# Patient Record
Sex: MALE | Race: WHITE | URBAN - NONMETROPOLITAN AREA
[De-identification: names, ages, dates, MRNs, and addresses within clinical notes are randomized per-mention and may not be internally consistent; named-entity substitution may affect disease eponyms.]

---

## 2019-02-25 LAB
ALBUMIN SERPL-MCNC: NORMAL G/DL
ALP BLD-CCNC: NORMAL U/L
ALT SERPL-CCNC: NORMAL U/L
ANION GAP SERPL CALCULATED.3IONS-SCNC: NORMAL MMOL/L
AST SERPL-CCNC: NORMAL U/L
BILIRUB SERPL-MCNC: NORMAL MG/DL
BUN BLDV-MCNC: NORMAL MG/DL
CALCIUM SERPL-MCNC: NORMAL MG/DL
CHLORIDE BLD-SCNC: NORMAL MMOL/L
CHOLESTEROL, TOTAL: NORMAL
CHOLESTEROL/HDL RATIO: NORMAL
CO2: NORMAL
CREAT SERPL-MCNC: NORMAL MG/DL
GFR CALCULATED: NORMAL
GLUCOSE BLD-MCNC: NORMAL MG/DL
HDLC SERPL-MCNC: NORMAL MG/DL
LDL CHOLESTEROL CALCULATED: NORMAL
POTASSIUM SERPL-SCNC: NORMAL MMOL/L
SODIUM BLD-SCNC: NORMAL MMOL/L
TOTAL PROTEIN: NORMAL
TRIGL SERPL-MCNC: NORMAL MG/DL
TSH SERPL DL<=0.05 MIU/L-ACNC: NORMAL M[IU]/L
VLDLC SERPL CALC-MCNC: NORMAL MG/DL

## 2019-05-30 ENCOUNTER — TELEPHONE (OUTPATIENT)
Dept: FAMILY MEDICINE CLINIC | Age: 29
End: 2019-05-30

## 2019-05-30 NOTE — LETTER
[address] [facility]  [address], [address] [address]  Phone: [phone]  Fax: [phone]    Mateusz Kolb MD        June 13, 2019    [address] [place] [address]      Dear Caterina Pettit:      I have been unable to reach you by phone, we have your test results. Please return our call at your earliest convenience. If you have any questions or concerns, please don't hesitate to call.     Sincerely,        Mateusz Kolb MD

## 2019-05-30 NOTE — TELEPHONE ENCOUNTER
Dad Lanita Severin calling in regards to pts' quest bill. Invoice#[phone] drawn on 02/25/19 . He states the tsh was billed as preventative and needs to be diagnostic. Ok to add what dx? He is aware you are not in today.

## 2020-01-03 SDOH — HEALTH STABILITY: MENTAL HEALTH: HOW OFTEN DO YOU HAVE A DRINK CONTAINING ALCOHOL?: NEVER

## 2020-02-24 ENCOUNTER — HOSPITAL ENCOUNTER (OUTPATIENT)
Age: 30
Discharge: HOME | End: 2020-02-26
Payer: COMMERCIAL

## 2020-02-24 ENCOUNTER — OFFICE VISIT (OUTPATIENT)
Dept: PRIMARY CARE CLINIC | Age: 30
End: 2020-02-24
Payer: COMMERCIAL

## 2020-02-24 VITALS
HEIGHT: 70 IN | BODY MASS INDEX: 24.62 KG/M2 | SYSTOLIC BLOOD PRESSURE: 108 MMHG | DIASTOLIC BLOOD PRESSURE: 68 MMHG | WEIGHT: 172 LBS | HEART RATE: 81 BPM | TEMPERATURE: 98.4 F | RESPIRATION RATE: 18 BRPM | OXYGEN SATURATION: 98 %

## 2020-02-24 PROBLEM — F84.0 AUTISM: Status: ACTIVE | Noted: 2020-02-24

## 2020-02-24 PROBLEM — E78.2 MIXED HYPERLIPIDEMIA: Status: ACTIVE | Noted: 2020-02-24

## 2020-02-24 PROBLEM — Z00.00 ENCOUNTER FOR ANNUAL PHYSICAL EXAM: Status: ACTIVE | Noted: 2020-02-24

## 2020-02-24 LAB
CHOLESTEROL, TOTAL: 244 MG/DL (ref 0–199)
HDLC SERPL-MCNC: 50 MG/DL
LDL CHOLESTEROL CALCULATED: 148 MG/DL (ref 0–99)
TRIGL SERPL-MCNC: 229 MG/DL (ref 0–149)
TSH SERPL DL<=0.05 MIU/L-ACNC: 2.89 UIU/ML (ref 0.27–4.2)
VLDLC SERPL CALC-MCNC: 46 MG/DL

## 2020-02-24 PROCEDURE — 99395 PREV VISIT EST AGE 18-39: CPT | Performed by: INTERNAL MEDICINE

## 2020-02-24 PROCEDURE — 84443 ASSAY THYROID STIM HORMONE: CPT

## 2020-02-24 PROCEDURE — 80061 LIPID PANEL: CPT

## 2020-02-24 PROCEDURE — 36415 COLL VENOUS BLD VENIPUNCTURE: CPT

## 2020-02-24 ASSESSMENT — PATIENT HEALTH QUESTIONNAIRE - PHQ9
1. LITTLE INTEREST OR PLEASURE IN DOING THINGS: 0
SUM OF ALL RESPONSES TO PHQ9 QUESTIONS 1 & 2: 0
SUM OF ALL RESPONSES TO PHQ QUESTIONS 1-9: 0
SUM OF ALL RESPONSES TO PHQ QUESTIONS 1-9: 0
2. FEELING DOWN, DEPRESSED OR HOPELESS: 0

## 2020-02-24 NOTE — PROGRESS NOTES
canals are clear and dry. Tympanic membranes are intact. External nose WNL. Moistness and normal color of the nasal mucosae. Septum is in the midline. Dentition is in good  repair. Gums appear healthy. Posterior pharynx shows no exudate, irritation or redness. Neck: Supple and symmetric. Palpation reveals no adenopathy. No masses appreciated. Thyroid  exhibits no nodules or thyromegaly. No JVD. Resp: Respirations are unlabored. Respiration rate is normal. Auscultate good airflow. No rales,  rhonchi or wheezes appreciated over the lungs bilaterally. CV: Rhythm is regular. S1 is normal. S2 is normal. No heart murmur appreciated. Carotids: no  bruits. Abdominal aorta is not palpable. Pedal pulses: 2+ and equal bilaterally. Extremities: No clubbing, cyanosis or edema. Abdomen: Bowel sounds are normoactive. Palpation of the abdomen reveals softness, but no  distension, organomegaly or tenderness. No abdominal masses. No palpable hepatosplenomegaly. Musculo: Walks with a normal gait. Upper Extremities: Full ROM bilaterally. Lower Extremities: Full  ROM bilaterally. Skin: Skin is warm and dry. Neuro: Alert and oriented x3. Mood is normal. Affect is normal. Speech is articulate and fluent. Reflexes: DTR's are intact, symmetric and 2+ bilaterally. Psych: Patient's attitude is cooperative. Patient's affect is appropriate. Judgement is realistic. Insight  is appropriate. Bhavesh was seen today for annual exam.    Diagnoses and all orders for this visit:    Mixed hyperlipidemia  -     TSH without Reflex; Future  -     Lipid Panel; Future    Encounter for annual physical exam    Autism    Patient is to be seen back in 1 year. Lipid profile will be obtained. Because of the family history of hypothyroidism this will also be checked. Patient's family does not wish him to have influenza vaccination.

## 2020-03-25 PROBLEM — Z00.00 ENCOUNTER FOR ANNUAL PHYSICAL EXAM: Status: RESOLVED | Noted: 2020-02-24 | Resolved: 2020-03-25

## 2021-10-07 ENCOUNTER — OFFICE VISIT (OUTPATIENT)
Dept: PRIMARY CARE CLINIC | Age: 31
End: 2021-10-07

## 2021-10-07 VITALS
OXYGEN SATURATION: 98 % | TEMPERATURE: 97.5 F | BODY MASS INDEX: 27.49 KG/M2 | WEIGHT: 192 LBS | HEART RATE: 74 BPM | HEIGHT: 70 IN | SYSTOLIC BLOOD PRESSURE: 126 MMHG | DIASTOLIC BLOOD PRESSURE: 80 MMHG

## 2021-10-07 DIAGNOSIS — Z00.00 ENCOUNTER FOR PREVENTIVE HEALTH EXAMINATION: ICD-10-CM

## 2021-10-07 DIAGNOSIS — K21.9 GASTROESOPHAGEAL REFLUX DISEASE WITHOUT ESOPHAGITIS: Primary | ICD-10-CM

## 2021-10-07 DIAGNOSIS — E78.2 MIXED HYPERLIPIDEMIA: ICD-10-CM

## 2021-10-07 DIAGNOSIS — F84.0 AUTISM: ICD-10-CM

## 2021-10-07 PROCEDURE — 99395 PREV VISIT EST AGE 18-39: CPT | Performed by: INTERNAL MEDICINE

## 2021-10-07 RX ORDER — OMEPRAZOLE 20 MG/1
20 CAPSULE, DELAYED RELEASE ORAL
Qty: 90 CAPSULE | Refills: 3 | Status: SHIPPED | OUTPATIENT
Start: 2021-10-07

## 2021-10-07 NOTE — PROGRESS NOTES
This is a patient with high functioning autism. 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 Patient has done well. He denies any cardiac or respiratory symptoms. Functional status is excellent. He has had both Covid vaccinations. He has no physical limitations including any musculoskeletal problems. He has had symptoms of reflux and he currently is on over-the-counter omeprazole which he takes on a continuous basis. I did write a note to his mother indicating that perhaps a switch to an H2 blocker may be advisable especially if he is going to be on it long-term. The patient is not having any danger symptoms of reflux. Patient does have a history of hyperlipidemia. His reflux symptomatology seems to be brought on by intake of spicy or tomato based products. We did discuss trying to eliminate these. HPI:  ROS:  Const: General health stated as excellent. Eyes: Denies eye symptoms. ENMT: Denies ear symptoms. Denies nasal symptoms. CV: Denies cardiovascular symptoms. Resp: Denies respiratory symptoms. GI: GERD without danger symptoms. : Denies urinary problems. Musculo: Denies musculoskeletal symptoms. Skin: Denies current symptomatology. Neuro: Denies neurologic symptoms. Psych: AUTISM  Endocrine: Denies endocrine symptoms. Hema/Lymph: Denies hematologic symptoms. Denies lymphatic symptoms. Current Outpatient Medications:     omeprazole (PRILOSEC) 20 MG delayed release capsule, Take 1 capsule by mouth every morning (before breakfast), Disp: 90 capsule, Rfl: 3  Allergies: NKDA  PMH:  Health Maintenance:  Physical Exam - (10/7/2021)  Tetanus Immunization - 2014  Medical Problems:  Autism - HIGH FUNCTION  GERD-10/7/2021  Hyperliidemia 2019  Surgical Hx:  Appendectomy - AGE 11  Reviewed and updated. FH:  Father:  Hyperlipidemia, Non Insulin Dependent Diabetes, Hypertension, Osteoarthritis. Mother:  Hypothyroidism, Kidney Stones, Hypertension.   Reviewed, no changes. SH:  Marital: Legal Status: Never . Personal Habits: Cigarette Use: Never Smoked Cigarettes. Alcohol: Denies alcohol use. Exercise  Type: Exercises regularly. Reviewed, no changes. Date: 10/7/2021  Was the patient queried about smoking behavior? Yes   Does the patient currently smoke? Smoking: Patient has never smoked. Objective  Vitals:    10/07/21 0927   BP: 126/80   Pulse: 74   Temp: 97.5 °F (36.4 °C)   SpO2: 98%   Exam:  Const: Appears healthy,well developed and well nourished. Appears to be moderately overweight. Eyes: EOMI in both eyes. PERRL. ENMT: External canals are clear and dry. Partially occluded external ear canals with cerumen. Tympanic membrane seen appear to be clear. Neck: Supple and symmetric. Palpation reveals no adenopathy. No masses appreciated. Thyroid  exhibits no nodules or thyromegaly. No JVD. Resp: Respirations are unlabored. Respiration rate is normal. Auscultate good airflow. No rales,  rhonchi or wheezes appreciated over the lungs bilaterally. CV: Rhythm is regular. S1 is normal. S2 is normal. No heart murmur appreciated. Carotids: no  bruits. Abdominal aorta is not palpable. Pedal pulses: 2+ and equal bilaterally. Extremities: No clubbing, cyanosis or edema. Abdomen: Bowel sounds are normoactive. Palpation of the abdomen reveals softness, but no  distension, organomegaly or tenderness. No abdominal masses. No palpable hepatosplenomegaly. Musculo: Walks with a normal gait. Upper Extremities: Full ROM bilaterally. Lower Extremities: Full  ROM bilaterally. Skin: Skin is warm and dry. Neuro: Alert and oriented x3. Mood is normal. Affect is normal. Speech is articulate and fluent. Reflexes: DTR's are intact, symmetric and 2+ bilaterally. Psych: Patient's attitude is cooperative. Patient's affect is appropriate. Judgement is realistic. Insight  is appropriate.   Bhavesh was seen today for annual exam.    Diagnoses and all orders for this visit:    Gastroesophageal reflux disease without esophagitis  -     omeprazole (PRILOSEC) 20 MG delayed release capsule; Take 1 capsule by mouth every morning (before breakfast)  -     Lipid Panel; Future  -     CBC Auto Differential; Future    Encounter for preventive health examination    Autism    Mixed hyperlipidemia  -     Lipid Panel; Future  -     CBC Auto Differential; Future    I would like the patient to taper off of omeprazole and begin an H2 blocker instead. I would like him to eliminate foods which cause heartburn symptomatology. We will check a lipid profile as he has hyperlipidemia and is has a strong family history of diabetes and hyperlipidemia. Patient is to be seen back in 1 year. Participation forms are completed. Bhargav Stevan

## 2021-10-19 ENCOUNTER — TELEPHONE (OUTPATIENT)
Dept: FAMILY MEDICINE CLINIC | Age: 31
End: 2021-10-19

## 2021-10-19 NOTE — TELEPHONE ENCOUNTER
Notified pt and father, Lovely Al of lab results (unable to document in the lab note/not in the basket). Sent copy of labs and the Mediterranean/dash diet to pt.

## 2021-11-06 PROBLEM — Z00.00 ENCOUNTER FOR PREVENTIVE HEALTH EXAMINATION: Status: RESOLVED | Noted: 2020-02-24 | Resolved: 2021-11-06

## 2022-11-21 ENCOUNTER — TELEPHONE (OUTPATIENT)
Dept: FAMILY MEDICINE CLINIC | Age: 32
End: 2022-11-21

## 2022-11-21 DIAGNOSIS — K21.9 GASTROESOPHAGEAL REFLUX DISEASE WITHOUT ESOPHAGITIS: ICD-10-CM

## 2022-11-21 RX ORDER — OMEPRAZOLE 20 MG/1
CAPSULE, DELAYED RELEASE ORAL
Qty: 90 CAPSULE | Refills: 0 | Status: SHIPPED | OUTPATIENT
Start: 2022-11-21

## 2022-11-21 NOTE — TELEPHONE ENCOUNTER
----- Message from April Nagle sent at 11/21/2022  2:07 PM EST -----  Subject: Refill Request    QUESTIONS  Name of Medication? omeprazole (PRILOSEC) 20 MG delayed release capsule  Patient-reported dosage and instructions? TAKE ONE CAPSULE BY MOUTH EVERY   MORNING BEFORE BREAKFAST  How many days do you have left? 0  Preferred Pharmacy? [facility]  Pharmacy phone number (if available)? [phone]  ---------------------------------------------------------------------------  --------------  CALL BACK INFO  What is the best way for the office to contact you? OK to leave message on   voicemail  Preferred Call Back Phone Number? [phone]  ---------------------------------------------------------------------------  --------------  SCRIPT ANSWERS  Relationship to Patient? Parent  Representative Name? Antoinette Mariano  Patient is under 25 and the Parent has custody? Yes  Additional information verified (besides Name and Date of Birth)?  Address

## 2022-11-21 NOTE — TELEPHONE ENCOUNTER
90 day supply was sent. Patient will need a follow up. Unable to leave a message to update. Letter mailed to patient.

## 2022-11-21 NOTE — LETTER
[address] [place] [address] Drive  [address] [place] [address]  Phone: [phone]  Fax: [phone]    Milagro Brewer MD        November 21, 2022     [address] [place] [address]      Dear Emmy Query:    Our records indicate that you are past due for a follow up appointment. A 90 day refill has been sent to your pharmacy. Please call to schedule a follow up appointment within the next 90 days.      Sincerely,        Milagro Brewer MD

## 2023-02-07 ENCOUNTER — OFFICE VISIT (OUTPATIENT)
Dept: FAMILY MEDICINE CLINIC | Age: 33
End: 2023-02-07
Payer: MEDICAID

## 2023-02-07 VITALS
SYSTOLIC BLOOD PRESSURE: 130 MMHG | WEIGHT: 197 LBS | TEMPERATURE: 97.8 F | BODY MASS INDEX: 28.27 KG/M2 | DIASTOLIC BLOOD PRESSURE: 80 MMHG | HEART RATE: 94 BPM | OXYGEN SATURATION: 99 %

## 2023-02-07 DIAGNOSIS — R10.13 EPIGASTRIC PAIN: ICD-10-CM

## 2023-02-07 DIAGNOSIS — R35.1 NOCTURIA: ICD-10-CM

## 2023-02-07 DIAGNOSIS — E78.2 MIXED HYPERLIPIDEMIA: ICD-10-CM

## 2023-02-07 DIAGNOSIS — F84.0 AUTISM: Primary | ICD-10-CM

## 2023-02-07 LAB
ALBUMIN SERPL-MCNC: 4.4 G/DL (ref 3.5–5.2)
ALP BLD-CCNC: 74 U/L (ref 40–129)
ALT SERPL-CCNC: 36 U/L (ref 0–40)
ANION GAP SERPL CALCULATED.3IONS-SCNC: 15 MMOL/L (ref 7–16)
AST SERPL-CCNC: 26 U/L (ref 0–39)
BASOPHILS ABSOLUTE: 0.07 E9/L (ref 0–0.2)
BASOPHILS RELATIVE PERCENT: 1 % (ref 0–2)
BILIRUB SERPL-MCNC: 0.3 MG/DL (ref 0–1.2)
BILIRUBIN URINE: NEGATIVE
BLOOD, URINE: NEGATIVE
BUN BLDV-MCNC: 11 MG/DL (ref 6–20)
CALCIUM SERPL-MCNC: 9.5 MG/DL (ref 8.6–10.2)
CHLORIDE BLD-SCNC: 102 MMOL/L (ref 98–107)
CHOLESTEROL, TOTAL: 267 MG/DL (ref 0–199)
CLARITY: CLEAR
CO2: 21 MMOL/L (ref 22–29)
COLOR: YELLOW
CREAT SERPL-MCNC: 0.9 MG/DL (ref 0.7–1.2)
EOSINOPHILS ABSOLUTE: 0.16 E9/L (ref 0.05–0.5)
EOSINOPHILS RELATIVE PERCENT: 2.4 % (ref 0–6)
GFR SERPL CREATININE-BSD FRML MDRD: >60 ML/MIN/1.73
GLUCOSE BLD-MCNC: 89 MG/DL (ref 74–99)
GLUCOSE URINE: NEGATIVE MG/DL
HCT VFR BLD CALC: 45.3 % (ref 37–54)
HDLC SERPL-MCNC: 44 MG/DL
HEMOGLOBIN: 15.5 G/DL (ref 12.5–16.5)
IMMATURE GRANULOCYTES #: 0.04 E9/L
IMMATURE GRANULOCYTES %: 0.6 % (ref 0–5)
KETONES, URINE: NEGATIVE MG/DL
LDL CHOLESTEROL CALCULATED: 186 MG/DL (ref 0–99)
LEUKOCYTE ESTERASE, URINE: NEGATIVE
LIPASE: 23 U/L (ref 13–60)
LYMPHOCYTES ABSOLUTE: 1.84 E9/L (ref 1.5–4)
LYMPHOCYTES RELATIVE PERCENT: 27.5 % (ref 20–42)
MCH RBC QN AUTO: 31.3 PG (ref 26–35)
MCHC RBC AUTO-ENTMCNC: 34.2 % (ref 32–34.5)
MCV RBC AUTO: 91.5 FL (ref 80–99.9)
MONOCYTES ABSOLUTE: 0.55 E9/L (ref 0.1–0.95)
MONOCYTES RELATIVE PERCENT: 8.2 % (ref 2–12)
NEUTROPHILS ABSOLUTE: 4.04 E9/L (ref 1.8–7.3)
NEUTROPHILS RELATIVE PERCENT: 60.3 % (ref 43–80)
NITRITE, URINE: NEGATIVE
PDW BLD-RTO: 12.9 FL (ref 11.5–15)
PH UA: 6.5 (ref 5–9)
PLATELET # BLD: 289 E9/L (ref 130–450)
PMV BLD AUTO: 10.1 FL (ref 7–12)
POTASSIUM SERPL-SCNC: 4.5 MMOL/L (ref 3.5–5)
PROTEIN UA: NEGATIVE MG/DL
RBC # BLD: 4.95 E12/L (ref 3.8–5.8)
SODIUM BLD-SCNC: 138 MMOL/L (ref 132–146)
SPECIFIC GRAVITY UA: 1.02 (ref 1–1.03)
TOTAL PROTEIN: 7.7 G/DL (ref 6.4–8.3)
TRIGL SERPL-MCNC: 183 MG/DL (ref 0–149)
TSH SERPL DL<=0.05 MIU/L-ACNC: 5.53 UIU/ML (ref 0.27–4.2)
UROBILINOGEN, URINE: 0.2 E.U./DL
VLDLC SERPL CALC-MCNC: 37 MG/DL
WBC # BLD: 6.7 E9/L (ref 4.5–11.5)

## 2023-02-07 PROCEDURE — 81003 URINALYSIS AUTO W/O SCOPE: CPT | Performed by: INTERNAL MEDICINE

## 2023-02-07 PROCEDURE — 99214 OFFICE O/P EST MOD 30 MIN: CPT | Performed by: INTERNAL MEDICINE

## 2023-02-07 RX ORDER — OMEPRAZOLE 20 MG/1
20 CAPSULE, DELAYED RELEASE ORAL
Qty: 30 CAPSULE | Refills: 5 | Status: SHIPPED | OUTPATIENT
Start: 2023-02-07

## 2023-02-07 ASSESSMENT — PATIENT HEALTH QUESTIONNAIRE - PHQ9
1. LITTLE INTEREST OR PLEASURE IN DOING THINGS: 1
9. THOUGHTS THAT YOU WOULD BE BETTER OFF DEAD, OR OF HURTING YOURSELF: 0
8. MOVING OR SPEAKING SO SLOWLY THAT OTHER PEOPLE COULD HAVE NOTICED. OR THE OPPOSITE, BEING SO FIGETY OR RESTLESS THAT YOU HAVE BEEN MOVING AROUND A LOT MORE THAN USUAL: 1
4. FEELING TIRED OR HAVING LITTLE ENERGY: 3
2. FEELING DOWN, DEPRESSED OR HOPELESS: 3
10. IF YOU CHECKED OFF ANY PROBLEMS, HOW DIFFICULT HAVE THESE PROBLEMS MADE IT FOR YOU TO DO YOUR WORK, TAKE CARE OF THINGS AT HOME, OR GET ALONG WITH OTHER PEOPLE: 1
5. POOR APPETITE OR OVEREATING: 1
3. TROUBLE FALLING OR STAYING ASLEEP: 1
SUM OF ALL RESPONSES TO PHQ QUESTIONS 1-9: 10
SUM OF ALL RESPONSES TO PHQ QUESTIONS 1-9: 10
7. TROUBLE CONCENTRATING ON THINGS, SUCH AS READING THE NEWSPAPER OR WATCHING TELEVISION: 0
6. FEELING BAD ABOUT YOURSELF - OR THAT YOU ARE A FAILURE OR HAVE LET YOURSELF OR YOUR FAMILY DOWN: 0
SUM OF ALL RESPONSES TO PHQ QUESTIONS 1-9: 10
SUM OF ALL RESPONSES TO PHQ9 QUESTIONS 1 & 2: 4
SUM OF ALL RESPONSES TO PHQ QUESTIONS 1-9: 10

## 2023-02-07 NOTE — PROGRESS NOTES
This is a patient with high functioning autism.  Patient has been out of his Prilosec for about 6 months.  Over the last month he developed epigastric discomfort which then travels to his left lower quadrant.  He has a lot of pain especially in the mornings.  This does seem to be relieved by bowel movements.  He denies any diarrhea.  Questionable dark stool.  Patient has not had any pruritus.  Pain does not radiate to his back.  He has no significant heartburn or reflux symptomatology.  He does have nocturia which is concerning especially given that he has gained about 40 pounds and there is a family history of diabetes.  Patient is not currently working.  He states that he is very fatigued.  He did note that he had some lymph node swelling of his neck mostly in the posterior cervical chain.  He has been tired for at least the last several months.  HPI:  ROS:  Const: General health stated as good.  Eyes: Denies eye symptoms.  ENMT: Denies ear symptoms. Denies nasal symptoms.  CV: Denies cardiovascular symptoms.  Resp: Denies respiratory symptoms.  GI: Per HPI  : Denies urinary problems.  Musculo: Denies musculoskeletal symptoms.  Skin: Denies current symptomatology.  Neuro: Denies neurologic symptoms.  Psych: AUTISM  Endocrine: Denies endocrine symptoms.  Hema/Lymph: Denies hematologic symptoms. Denies lymphatic symptoms.    Current Outpatient Medications:     omeprazole (PRILOSEC) 20 MG delayed release capsule, Take 1 capsule by mouth every morning (before breakfast), Disp: 30 capsule, Rfl: 5    omeprazole (PRILOSEC) 20 MG delayed release capsule, TAKE ONE CAPSULE BY MOUTH EVERY MORNING BEFORE BREAKFAST (Patient not taking: Reported on 2/7/2023), Disp: 90 capsule, Rfl: 0  Allergies: NKDA  PMH:  Health Maintenance:  Physical Exam - (2/7/2023)  Tetanus Immunization - 2014  Medical Problems:  Autism - HIGH FUNCTION  GERD-10/7/2021  Hyperliidemia 2019  Abdominal pain 2/7/2023  Nocturia 2/7/2023  Fatigue  2/7/2023  Surgical Hx:  Appendectomy - AGE 11  Reviewed and updated. FH:  Father:  Hyperlipidemia, Non Insulin Dependent Diabetes, Hypertension, Osteoarthritis. Mother:  Hypothyroidism, Kidney Stones, Hypertension. Reviewed, no changes. SH:  Marital: Legal Status: Never . Personal Habits: Cigarette Use: Never Smoked Cigarettes. Alcohol: Denies alcohol use. Exercise  Type: Exercises regularly. Reviewed, no changes. Date: 2/7/2023  Was the patient queried about smoking behavior? Yes   Does the patient currently smoke? Smoking: Patient has never smoked. Objective  Vitals:    02/07/23 1325   BP: 130/80   Pulse: 94   Temp: 97.8 °F (36.6 °C)   SpO2: 99%   Exam:  Const: Appears healthy,well developed and well nourished. Appears to be moderately overweight. Eyes: EOMI in both eyes. PERRL. ENMT: External canals are clear and dry. Partially occluded external ear canals with cerumen. Tympanic membrane seen appear to be clear. Tonsils are enlarged but without exudate. Neck: Supple and symmetric. Palpation reveals no adenopathy, questionable shotty posterior cervical lymph nodes. No masses appreciated. Thyroid  exhibits no nodules or thyromegaly. No JVD. Resp: Respirations are unlabored. Respiration rate is normal. Auscultate good airflow. No rales,  rhonchi or wheezes appreciated over the lungs bilaterally. CV: Rhythm is regular. S1 is normal. S2 is normal. No heart murmur appreciated. Carotids: no  bruits. Abdominal aorta is not palpable. Pedal pulses: 2+ and equal bilaterally. Extremities: No clubbing, cyanosis or edema. Abdomen: Bowel sounds are normoactive. Palpation of the abdomen reveals softness, but no  distension, organomegaly or tenderness. No abdominal masses. No palpable hepatosplenomegaly. Even with deep palpation I am not able to reproduce discomfort. Musculo: Walks with a normal gait. Upper Extremities: Full ROM bilaterally. Lower Extremities: Full  ROM bilaterally.   Skin: Skin is warm and dry. Neuro: Alert and oriented x3. Mood is normal. Affect is normal. Speech is articulate and fluent. Reflexes: DTR's are intact, symmetric and 2+ bilaterally. Psych: Patient's attitude is cooperative. Patient's affect is appropriate. Judgement is realistic. Insight  is appropriate. Bhavesh was seen today for fatigue and abdominal pain. Diagnoses and all orders for this visit:    Autism    Epigastric pain  -     Sedimentation Rate; Future  -     Lipase; Future  -     CBC with Auto Differential; Future  -     Comprehensive Metabolic Panel; Future  -     Urinalysis; Future  -     TSH; Future  -     Lipid Panel; Future    Mixed hyperlipidemia  -     Sedimentation Rate; Future  -     Lipase; Future  -     CBC with Auto Differential; Future  -     Comprehensive Metabolic Panel; Future  -     Urinalysis; Future  -     TSH; Future  -     Lipid Panel; Future    Nocturia  -     Sedimentation Rate; Future  -     Lipase; Future  -     CBC with Auto Differential; Future  -     Comprehensive Metabolic Panel; Future  -     Urinalysis; Future  -     TSH; Future  -     Lipid Panel; Future    Other orders  -     omeprazole (PRILOSEC) 20 MG delayed release capsule; Take 1 capsule by mouth every morning (before breakfast)  Patient will have a work-up. Will be placed back on omeprazole. I really cannot localize the discomfort by examination. His history is somewhat tangent. I will go ahead and see him back in 1 month. Lab work will be obtained today. Depending on the labs he may need further evaluation.

## 2023-02-08 RX ORDER — ROSUVASTATIN CALCIUM 20 MG/1
20 TABLET, COATED ORAL DAILY
Qty: 90 TABLET | Refills: 1 | Status: SHIPPED | OUTPATIENT
Start: 2023-02-08

## 2023-03-07 ENCOUNTER — OFFICE VISIT (OUTPATIENT)
Dept: FAMILY MEDICINE CLINIC | Age: 33
End: 2023-03-07
Payer: MEDICAID

## 2023-03-07 VITALS
OXYGEN SATURATION: 98 % | HEART RATE: 64 BPM | TEMPERATURE: 97.5 F | SYSTOLIC BLOOD PRESSURE: 120 MMHG | BODY MASS INDEX: 28.55 KG/M2 | WEIGHT: 199 LBS | DIASTOLIC BLOOD PRESSURE: 80 MMHG

## 2023-03-07 DIAGNOSIS — F84.0 AUTISM: ICD-10-CM

## 2023-03-07 DIAGNOSIS — E78.2 MIXED HYPERLIPIDEMIA: ICD-10-CM

## 2023-03-07 DIAGNOSIS — R10.13 EPIGASTRIC PAIN: ICD-10-CM

## 2023-03-07 DIAGNOSIS — H61.23 CERUMEN DEBRIS ON TYMPANIC MEMBRANE OF BOTH EARS: ICD-10-CM

## 2023-03-07 DIAGNOSIS — E78.2 MIXED HYPERLIPIDEMIA: Primary | ICD-10-CM

## 2023-03-07 PROCEDURE — 99213 OFFICE O/P EST LOW 20 MIN: CPT | Performed by: INTERNAL MEDICINE

## 2023-03-07 NOTE — PROGRESS NOTES
This is a patient with high functioning autism. Patient's epigastric discomfort has improved to some extent although he still has some time intermittent discomfort. Denies black stools blood in the stools. He does have problems with impacted cerumen. HPI:  ROS:  Const: General health stated as good. Eyes: Denies eye symptoms. ENMT: Cerumen impaction. CV: Hyperlipidemia. Resp: Denies respiratory symptoms. GI: Per HPI  : Denies urinary problems. Musculo: Denies musculoskeletal symptoms. Skin: Denies current symptomatology. Neuro: Denies neurologic symptoms. Psych: AUTISM  Endocrine: Subclinical hypothyroidism  Hema/Lymph: Denies hematologic symptoms. Denies lymphatic symptoms. Current Outpatient Medications:     omeprazole (PRILOSEC) 20 MG delayed release capsule, Take 1 capsule by mouth every morning (before breakfast), Disp: 30 capsule, Rfl: 5    rosuvastatin (CRESTOR) 20 MG tablet, Take 1 tablet by mouth daily (Patient not taking: Reported on 3/7/2023), Disp: 90 tablet, Rfl: 1    omeprazole (PRILOSEC) 20 MG delayed release capsule, TAKE ONE CAPSULE BY MOUTH EVERY MORNING BEFORE BREAKFAST (Patient not taking: Reported on 2/7/2023), Disp: 90 capsule, Rfl: 0  Allergies: NKDA  PMH:  Health Maintenance:  Physical Exam - (2/7/2023)  Tetanus Immunization - 2014  Medical Problems:  Autism - HIGH FUNCTION  GERD-10/7/2021  Hyperliidemia 2019, worsened 2/2023  Abdominal pain 2/7/2023  Nocturia 2/7/2023  Fatigue 2/7/202  Subclinical hypothyroid February 2023  Impacted cerumen March 2023  Surgical Hx:  Appendectomy - AGE 11  Reviewed and updated. FH:  Father:  Hyperlipidemia, Non Insulin Dependent Diabetes, Hypertension, Osteoarthritis. Mother:  Hypothyroidism, Kidney Stones, Hypertension. Reviewed, no changes. SH:  Marital: Legal Status: Never . Personal Habits: Cigarette Use: Never Smoked Cigarettes. Alcohol: Denies alcohol use. Exercise  Type: Exercises regularly. Reviewed, no changes.   Date: 2/7/2023  Was the patient queried about smoking behavior? Yes   Does the patient currently smoke? Smoking: Patient has never smoked. Objective  Vitals:    03/07/23 0936   BP: 120/80   Pulse: 64   Temp: 97.5 °F (36.4 °C)   SpO2: 98%   Exam:  Const: Appears healthy,well developed and well nourished. Appears to be moderately overweight. Eyes: EOMI in both eyes. PERRL. ENMT: External canals are clear and dry. Impacted cerumen bilaterally. No exudate or erythema. Neck: Supple and symmetric. Palpation reveals no adenopathy, questionable shotty posterior cervical lymph nodes. No masses appreciated. Thyroid  exhibits no nodules or thyromegaly. No JVD. Resp: Respirations are unlabored. Respiration rate is normal. Auscultate good airflow. No rales,  rhonchi or wheezes appreciated over the lungs bilaterally. CV: Rhythm is regular. S1 is normal. S2 is normal. No heart murmur appreciated. Carotids: no  bruits. Abdominal aorta is not palpable. Pedal pulses: 2+ and equal bilaterally. Extremities: No clubbing, cyanosis or edema. Abdomen: Bowel sounds are normoactive. Palpation of the abdomen reveals softness, but no  distension, organomegaly or tenderness. No abdominal masses. No palpable hepatosplenomegaly. Even with deep palpation I am not able to reproduce discomfort. Musculo: Walks with a normal gait. Upper Extremities: Full ROM bilaterally. Lower Extremities: Full  ROM bilaterally. Skin: Skin is warm and dry. Neuro: Alert and oriented x3. Mood is normal. Affect is normal. Speech is articulate and fluent. Reflexes: DTR's are intact, symmetric and 2+ bilaterally. Psych: Patient's attitude is cooperative. Patient's affect is appropriate. Judgement is realistic. Insight  is appropriate. Bhavesh was seen today for follow-up. Diagnoses and all orders for this visit:    Mixed hyperlipidemia  -     Lipid Panel; Future  -     TSH;  Future  -     T4, Free; Future    Autism    Epigastric pain    Cerumen debris on tympanic membrane of both ears  Epigastric discomfort has improved with use of omeprazole. We will continue that medication. Patient will have repeat lipid levels. He did not go on statin therapy although he states he has modified his diet and lifestyle. His LDL of 186 is close to qualifying for a automatic statin therapy. We will recheck thyroid including free T4. He is instructed to get a Debrox kit and a note is given.

## 2023-04-03 DIAGNOSIS — E78.2 MIXED HYPERLIPIDEMIA: Primary | ICD-10-CM

## 2023-04-19 DIAGNOSIS — E78.2 MIXED HYPERLIPIDEMIA: Primary | ICD-10-CM

## 2023-04-20 RX ORDER — ROSUVASTATIN CALCIUM 20 MG/1
20 TABLET, COATED ORAL DAILY
Qty: 90 TABLET | Refills: 1 | Status: SHIPPED | OUTPATIENT
Start: 2023-04-20

## 2023-05-17 ENCOUNTER — OFFICE VISIT (OUTPATIENT)
Dept: FAMILY MEDICINE CLINIC | Age: 33
End: 2023-05-17
Payer: MEDICAID

## 2023-05-17 VITALS
SYSTOLIC BLOOD PRESSURE: 118 MMHG | WEIGHT: 199 LBS | RESPIRATION RATE: 18 BRPM | DIASTOLIC BLOOD PRESSURE: 70 MMHG | HEIGHT: 70 IN | HEART RATE: 88 BPM | OXYGEN SATURATION: 98 % | TEMPERATURE: 97.5 F | BODY MASS INDEX: 28.49 KG/M2

## 2023-05-17 DIAGNOSIS — H61.22 HEARING LOSS OF LEFT EAR DUE TO CERUMEN IMPACTION: ICD-10-CM

## 2023-05-17 DIAGNOSIS — H61.23 BILATERAL IMPACTED CERUMEN: Primary | ICD-10-CM

## 2023-05-17 PROCEDURE — 99213 OFFICE O/P EST LOW 20 MIN: CPT | Performed by: EMERGENCY MEDICINE

## 2023-05-17 ASSESSMENT — ENCOUNTER SYMPTOMS
ABDOMINAL PAIN: 0
SORE THROAT: 0
EYE PAIN: 0
DIARRHEA: 0
VOMITING: 0
NAUSEA: 0
COUGH: 0
BACK PAIN: 0
SINUS PRESSURE: 0
SHORTNESS OF BREATH: 0
EYE REDNESS: 0
WHEEZING: 0
EYE DISCHARGE: 0

## 2023-05-17 NOTE — PROGRESS NOTES
Chief Complaint:   Ear Fullness      History of Present Illness   HPI:  Nicole Camarillo is a 28 y.o. male who presents to [facility] today for bilateral ear fullness and hearing loss L ear. History of cerumen impaction. Prior to Visit Medications    Medication Sig Taking? Authorizing Provider   rosuvastatin (CRESTOR) 20 MG tablet Take 1 tablet by mouth daily Yes Alda King MD   omeprazole (PRILOSEC) 20 MG delayed release capsule Take 1 capsule by mouth every morning (before breakfast) Yes Alda King MD   omeprazole (PRILOSEC) 20 MG delayed release capsule TAKE ONE CAPSULE BY [address] BREAKFAST  Patient not taking: Reported on 2/7/2023  Alda King MD       Review of Systems   Review of Systems   Constitutional:  Negative for chills and fever. HENT:  Positive for ear pain and hearing loss. Negative for sinus pressure and sore throat. Eyes:  Negative for pain, discharge and redness. Respiratory:  Negative for cough, shortness of breath and wheezing. Cardiovascular:  Negative for chest pain. Gastrointestinal:  Negative for abdominal pain, diarrhea, nausea and vomiting. Genitourinary:  Negative for dysuria and frequency. Musculoskeletal:  Negative for arthralgias and back pain. Skin:  Negative for rash and wound. Neurological:  Negative for weakness and headaches. Hematological:  Negative for adenopathy. Psychiatric/Behavioral: Negative. All other systems reviewed and are negative. Patient's medical, social, and family history reviewed    Past Medical History:  has a past medical history of Autism. Past Surgical History:  has a past surgical history that includes Appendectomy. Social History:  reports that he has never smoked. He has never used smokeless tobacco. He reports that he does not drink alcohol and does not use drugs.   Family History: family history includes Diabetes in his father; High Blood Pressure in his father and mother;

## 2023-09-10 RX ORDER — OMEPRAZOLE 20 MG/1
20 CAPSULE, DELAYED RELEASE ORAL
Qty: 30 CAPSULE | Refills: 5 | Status: SHIPPED | OUTPATIENT
Start: 2023-09-10

## 2023-12-13 ENCOUNTER — OFFICE VISIT (OUTPATIENT)
Dept: FAMILY MEDICINE CLINIC | Age: 33
End: 2023-12-13
Payer: COMMERCIAL

## 2023-12-13 VITALS
OXYGEN SATURATION: 99 % | WEIGHT: 199 LBS | BODY MASS INDEX: 28.55 KG/M2 | TEMPERATURE: 99.3 F | DIASTOLIC BLOOD PRESSURE: 80 MMHG | HEART RATE: 70 BPM | SYSTOLIC BLOOD PRESSURE: 118 MMHG

## 2023-12-13 DIAGNOSIS — E78.2 MIXED HYPERLIPIDEMIA: ICD-10-CM

## 2023-12-13 DIAGNOSIS — R06.00 DYSPNEA, UNSPECIFIED TYPE: ICD-10-CM

## 2023-12-13 DIAGNOSIS — J30.1 NON-SEASONAL ALLERGIC RHINITIS DUE TO POLLEN: ICD-10-CM

## 2023-12-13 DIAGNOSIS — R06.00 DYSPNEA, UNSPECIFIED TYPE: Primary | ICD-10-CM

## 2023-12-13 DIAGNOSIS — F84.0 AUTISM: ICD-10-CM

## 2023-12-13 LAB
ABSOLUTE IMMATURE GRANULOCYTE: <0.03 K/UL (ref 0–0.58)
ALBUMIN SERPL-MCNC: 4.5 G/DL (ref 3.5–5.2)
ALP BLD-CCNC: 83 U/L (ref 40–129)
ALT SERPL-CCNC: 32 U/L (ref 0–40)
ANION GAP SERPL CALCULATED.3IONS-SCNC: 16 MMOL/L (ref 7–16)
AST SERPL-CCNC: 28 U/L (ref 0–39)
BASOPHILS ABSOLUTE: 0.07 K/UL (ref 0–0.2)
BASOPHILS RELATIVE PERCENT: 1 % (ref 0–2)
BILIRUB SERPL-MCNC: 0.3 MG/DL (ref 0–1.2)
BUN BLDV-MCNC: 10 MG/DL (ref 6–20)
CALCIUM SERPL-MCNC: 9.6 MG/DL (ref 8.6–10.2)
CHLORIDE BLD-SCNC: 99 MMOL/L (ref 98–107)
CHOLESTEROL: 191 MG/DL
CO2: 21 MMOL/L (ref 22–29)
CREAT SERPL-MCNC: 0.9 MG/DL (ref 0.7–1.2)
EOSINOPHILS ABSOLUTE: 0.12 K/UL (ref 0.05–0.5)
EOSINOPHILS RELATIVE PERCENT: 2 % (ref 0–6)
GFR SERPL CREATININE-BSD FRML MDRD: >60 ML/MIN/1.73M2
GLUCOSE BLD-MCNC: 96 MG/DL (ref 74–99)
HCT VFR BLD CALC: 42.6 % (ref 37–54)
HDLC SERPL-MCNC: 56 MG/DL
HEMOGLOBIN: 14.6 G/DL (ref 12.5–16.5)
IMMATURE GRANULOCYTES: 0 % (ref 0–5)
LDL CHOLESTEROL: 115 MG/DL
LYMPHOCYTES ABSOLUTE: 1.38 K/UL (ref 1.5–4)
LYMPHOCYTES RELATIVE PERCENT: 26 % (ref 20–42)
MCH RBC QN AUTO: 30.9 PG (ref 26–35)
MCHC RBC AUTO-ENTMCNC: 34.3 G/DL (ref 32–34.5)
MCV RBC AUTO: 90.3 FL (ref 80–99.9)
MONOCYTES ABSOLUTE: 0.35 K/UL (ref 0.1–0.95)
MONOCYTES RELATIVE PERCENT: 7 % (ref 2–12)
NEUTROPHILS ABSOLUTE: 3.28 K/UL (ref 1.8–7.3)
NEUTROPHILS RELATIVE PERCENT: 63 % (ref 43–80)
PDW BLD-RTO: 12.9 % (ref 11.5–15)
PLATELET # BLD: 305 K/UL (ref 130–450)
PMV BLD AUTO: 10.2 FL (ref 7–12)
POTASSIUM SERPL-SCNC: 4.2 MMOL/L (ref 3.5–5)
RBC # BLD: 4.72 M/UL (ref 3.8–5.8)
SODIUM BLD-SCNC: 136 MMOL/L (ref 132–146)
TOTAL PROTEIN: 7.5 G/DL (ref 6.4–8.3)
TRIGL SERPL-MCNC: 100 MG/DL
TSH SERPL DL<=0.05 MIU/L-ACNC: 4.37 UIU/ML (ref 0.27–4.2)
VLDLC SERPL CALC-MCNC: 20 MG/DL
WBC # BLD: 5.2 K/UL (ref 4.5–11.5)

## 2023-12-13 PROCEDURE — 99214 OFFICE O/P EST MOD 30 MIN: CPT | Performed by: INTERNAL MEDICINE

## 2023-12-13 RX ORDER — FLUTICASONE PROPIONATE 50 MCG
2 SPRAY, SUSPENSION (ML) NASAL DAILY
Qty: 16 G | Refills: 5 | Status: SHIPPED | OUTPATIENT
Start: 2023-12-13

## 2023-12-13 NOTE — PROGRESS NOTES
no  distension, organomegaly or tenderness. No abdominal masses. No palpable hepatosplenomegaly. Musculo: Walks with a normal gait. Upper Extremities: Full ROM bilaterally. Lower Extremities: Full  ROM bilaterally. Skin: Skin is warm and dry. Neuro: Alert and oriented x3. Mood is normal. Affect is normal. Speech is articulate and fluent. Reflexes: DTR's are intact, symmetric and 2+ bilaterally. Psych: Patient's attitude is cooperative. Patient's affect is appropriate. Judgement is realistic. Insight  is appropriate. Bhavesh was seen today for 6 month follow-up, shortness of breath and dizziness. Diagnoses and all orders for this visit:    Dyspnea, unspecified type  -     XR CHEST STANDARD (2 VW); Future  -     Sedimentation Rate; Future  -     Lipid Panel; Future  -     CBC with Auto Differential; Future  -     TSH; Future  -     Comprehensive Metabolic Panel; Future    Mixed hyperlipidemia  -     XR CHEST STANDARD (2 VW); Future  -     Sedimentation Rate; Future  -     Lipid Panel; Future  -     CBC with Auto Differential; Future  -     TSH; Future  -     Comprehensive Metabolic Panel; Future    Autism  -     XR CHEST STANDARD (2 VW); Future  -     Sedimentation Rate; Future  -     Lipid Panel; Future  -     CBC with Auto Differential; Future  -     TSH; Future  -     Comprehensive Metabolic Panel; Future    Non-seasonal allergic rhinitis due to pollen  -     XR CHEST STANDARD (2 VW); Future  -     Sedimentation Rate; Future  -     Lipid Panel; Future  -     CBC with Auto Differential; Future  -     TSH; Future  -     Comprehensive Metabolic Panel; Future    Other orders  -     fluticasone (FLONASE) 50 MCG/ACT nasal spray; 2 sprays by Each Nostril route daily    Patient is to be seen back in approximately 2 months. I will work this up including chest x-ray and labs.   Is a very vague historian I want make sure I do not miss something but at the same time his performance status seems to be too good to be

## 2023-12-14 LAB — SEDIMENTATION RATE, ERYTHROCYTE: 10 MM/HR (ref 0–15)

## 2023-12-14 RX ORDER — LEVOTHYROXINE SODIUM 0.03 MG/1
25 TABLET ORAL DAILY
Qty: 30 TABLET | Refills: 5 | Status: SHIPPED | OUTPATIENT
Start: 2023-12-14

## 2024-02-05 ENCOUNTER — TELEPHONE (OUTPATIENT)
Dept: FAMILY MEDICINE CLINIC | Age: 34
End: 2024-02-05

## 2024-02-05 NOTE — TELEPHONE ENCOUNTER
Patient's dad called in and reported that son works at [employer] and tested positive for covid yesterday.  The whole household has tested positive.  Patient is experiencing fever, chills, congestion, cough, and sore throat.  Advised to treat the symptoms.  Patient's dad is asking if medication can be called in?  Uses [facility] in [place].    Separate encounters for patient's dad and mom.

## 2024-02-05 NOTE — TELEPHONE ENCOUNTER
Per Dr. Ramos:    The patient does not qualify for Paxlovid because of his age and he does not have substantial comorbidities.

## 2024-08-01 ENCOUNTER — OFFICE VISIT (OUTPATIENT)
Dept: FAMILY MEDICINE CLINIC | Age: 34
End: 2024-08-01
Payer: COMMERCIAL

## 2024-08-01 VITALS
HEIGHT: 70 IN | HEART RATE: 67 BPM | WEIGHT: 204 LBS | RESPIRATION RATE: 18 BRPM | BODY MASS INDEX: 29.2 KG/M2 | TEMPERATURE: 98.4 F | OXYGEN SATURATION: 97 % | SYSTOLIC BLOOD PRESSURE: 128 MMHG | DIASTOLIC BLOOD PRESSURE: 80 MMHG

## 2024-08-01 DIAGNOSIS — H61.21 IMPACTED CERUMEN OF RIGHT EAR: ICD-10-CM

## 2024-08-01 DIAGNOSIS — J01.00 ACUTE NON-RECURRENT MAXILLARY SINUSITIS: Primary | ICD-10-CM

## 2024-08-01 DIAGNOSIS — R09.81 SINUS CONGESTION: ICD-10-CM

## 2024-08-01 PROCEDURE — 99213 OFFICE O/P EST LOW 20 MIN: CPT | Performed by: NURSE PRACTITIONER

## 2024-08-01 RX ORDER — AMOXICILLIN AND CLAVULANATE POTASSIUM 875; 125 MG/1; MG/1
1 TABLET, FILM COATED ORAL 2 TIMES DAILY
Qty: 20 TABLET | Refills: 0 | Status: SHIPPED | OUTPATIENT
Start: 2024-08-01 | End: 2024-08-11

## 2024-08-01 NOTE — PROGRESS NOTES
takes less than 2 seconds.   Neurological:      General: No focal deficit present.      Mental Status: He is alert and oriented to person, place, and time.   Psychiatric:         Mood and Affect: Mood normal.         Behavior: Behavior normal.          Testing:   (All laboratory and radiology results have been personally reviewed by myself)  Labs:  No results found for this visit on 08/01/24.    Imaging:  All Radiology results interpreted by Radiologist unless otherwise noted.  No orders to display       Assessment / Plan:   The patient's vitals, allergies, medications, and past medical history have been reviewed.  Bhavesh was seen today for congestion.    Diagnoses and all orders for this visit:    Acute non-recurrent maxillary sinusitis  -     amoxicillin-clavulanate (AUGMENTIN) 875-125 MG per tablet; Take 1 tablet by mouth 2 times daily for 10 days    Sinus congestion    Impacted cerumen of right ear        - Disposition: Home    - Educational material printed for patient's review and were included in patient instructions. After Visit Summary was given to patient at the end of visit.    - Zyrtec / Flonase prn rhinitis, Debrox cerumen impaction    - Encouraged oral fluids and rest. Discussed symptomatic treatments with patient today. The patient is to follow-up with PCP in the next 2-3 days for reevaluation. Red flag symptoms were also discussed with the patient today. If symptoms worsen the patient is to go directly to the emergency department for reevaluation and treatment. Pt verbalizes understanding and is in agreement with plan of care. All questions answered.      SIGNATURE: Blayne Law, MINESH-CNP    *NOTE: This report was transcribed using voice recognition software. Every effort was made to ensure accuracy; however, inadvertent computerized transcription errors may be present.

## 2024-12-18 ENCOUNTER — OFFICE VISIT (OUTPATIENT)
Dept: FAMILY MEDICINE CLINIC | Age: 34
End: 2024-12-18
Payer: COMMERCIAL

## 2024-12-18 VITALS
HEART RATE: 87 BPM | HEIGHT: 70 IN | BODY MASS INDEX: 29.35 KG/M2 | OXYGEN SATURATION: 99 % | WEIGHT: 205 LBS | RESPIRATION RATE: 18 BRPM | TEMPERATURE: 98 F | SYSTOLIC BLOOD PRESSURE: 138 MMHG | DIASTOLIC BLOOD PRESSURE: 72 MMHG

## 2024-12-18 DIAGNOSIS — J01.90 ACUTE SINUSITIS, RECURRENCE NOT SPECIFIED, UNSPECIFIED LOCATION: Primary | ICD-10-CM

## 2024-12-18 PROCEDURE — 99203 OFFICE O/P NEW LOW 30 MIN: CPT | Performed by: PHYSICIAN ASSISTANT

## 2024-12-18 RX ORDER — METHYLPREDNISOLONE 4 MG/1
TABLET ORAL
Qty: 1 KIT | Refills: 0 | Status: SHIPPED | OUTPATIENT
Start: 2024-12-18

## 2024-12-18 RX ORDER — CETIRIZINE HYDROCHLORIDE 10 MG/1
10 TABLET ORAL DAILY
Qty: 30 TABLET | Refills: 0 | Status: SHIPPED | OUTPATIENT
Start: 2024-12-18

## 2024-12-18 RX ORDER — FLUTICASONE PROPIONATE 50 MCG
2 SPRAY, SUSPENSION (ML) NASAL DAILY
Qty: 16 G | Refills: 0 | Status: SHIPPED | OUTPATIENT
Start: 2024-12-18

## 2024-12-18 ASSESSMENT — ENCOUNTER SYMPTOMS
PHOTOPHOBIA: 0
BACK PAIN: 0
ABDOMINAL PAIN: 0
DIARRHEA: 0
COUGH: 0
SHORTNESS OF BREATH: 0
VOMITING: 0
NAUSEA: 0
SORE THROAT: 0

## 2024-12-18 NOTE — PROGRESS NOTES
tablet, Rfl: 0    methylPREDNISolone (MEDROL DOSEPACK) 4 MG tablet, Take by mouth., Disp: 1 kit, Rfl: 0    fluticasone (FLONASE) 50 MCG/ACT nasal spray, 2 sprays by Each Nostril route daily, Disp: 16 g, Rfl: 0    cetirizine (ZYRTEC) 10 MG tablet, Take 1 tablet by mouth daily, Disp: 30 tablet, Rfl: 0    levothyroxine (SYNTHROID) 25 MCG tablet, Take 1 tablet by mouth daily, Disp: 30 tablet, Rfl: 5    omeprazole (PRILOSEC) 20 MG delayed release capsule, Take 1 capsule by mouth every morning (before breakfast), Disp: 30 capsule, Rfl: 5    rosuvastatin (CRESTOR) 20 MG tablet, Take 1 tablet by mouth daily (Patient not taking: Reported on 8/1/2024), Disp: 90 tablet, Rfl: 1    Allergies:   No Known Allergies    Social History:     Social History     Tobacco Use    Smoking status: Never    Smokeless tobacco: Never   Substance Use Topics    Alcohol use: Never    Drug use: Never       Patient lives at home.    Physical Exam:     Vitals:    12/18/24 1341   BP: 138/72   Pulse: 87   Resp: 18   Temp: 98 °F (36.7 °C)   SpO2: 99%   Weight: 93 kg (205 lb)   Height: 1.778 m (5' 10\")       Exam:  Physical Exam  Vitals and nursing note reviewed.   Constitutional:       General: He is not in acute distress.     Appearance: He is well-developed.   HENT:      Head: Normocephalic and atraumatic.      Right Ear: Tympanic membrane normal.      Left Ear: Tympanic membrane normal.      Nose: Nose normal.      Mouth/Throat:      Mouth: Mucous membranes are moist.      Pharynx: Oropharynx is clear.   Eyes:      Conjunctiva/sclera: Conjunctivae normal.      Pupils: Pupils are equal, round, and reactive to light.   Cardiovascular:      Rate and Rhythm: Normal rate and regular rhythm.   Pulmonary:      Effort: Pulmonary effort is normal. No respiratory distress.      Breath sounds: Normal breath sounds.   Abdominal:      General: Bowel sounds are normal.      Palpations: Abdomen is soft.      Tenderness: There is no abdominal tenderness.

## 2025-07-25 ENCOUNTER — TELEPHONE (OUTPATIENT)
Dept: PRIMARY CARE CLINIC | Age: 35
End: 2025-07-25

## 2025-07-25 NOTE — TELEPHONE ENCOUNTER
Patients dad called to schedule appointment for patient he said patients back has been hurting again and wanted in to see Dr. Ramos before first available on 09/29/25. Please call and advise

## 2025-07-25 NOTE — TELEPHONE ENCOUNTER
I did speak with the father and advised walk in. He wanted to know who the elan is that Dr Ramos is going to set his family up with after he leaves. The father has upcoming appt in Sept, but he would want to set patient up with the provider that Dr Ramos is going to send them to. I advised we would call him back and let him know who that would be when we hear from Dr Ramos.